# Patient Record
Sex: FEMALE | URBAN - METROPOLITAN AREA
[De-identification: names, ages, dates, MRNs, and addresses within clinical notes are randomized per-mention and may not be internally consistent; named-entity substitution may affect disease eponyms.]

---

## 2022-12-14 ENCOUNTER — OFFICE VISIT (OUTPATIENT)
Dept: URBAN - METROPOLITAN AREA CLINIC 33 | Facility: CLINIC | Age: 52
End: 2022-12-14
Payer: COMMERCIAL

## 2022-12-14 DIAGNOSIS — H52.4 PRESBYOPIA: ICD-10-CM

## 2022-12-14 DIAGNOSIS — H04.123 DRY EYE SYNDROME OF BILATERAL LACRIMAL GLANDS: Primary | ICD-10-CM

## 2022-12-14 PROCEDURE — 92310 CONTACT LENS FITTING OU: CPT

## 2022-12-14 PROCEDURE — 92004 COMPRE OPH EXAM NEW PT 1/>: CPT

## 2022-12-14 ASSESSMENT — VISUAL ACUITY
OS: 20/20
OD: 20/20

## 2022-12-14 ASSESSMENT — INTRAOCULAR PRESSURE
OD: 8
OS: 8

## 2022-12-14 NOTE — IMPRESSION/PLAN
Impression: Presbyopia: H52.4.
-trialed Ultra monovision 
-good fit, vision, and comfort Plan: Patient educated on all refractive findings. SRx was finalized and released to patient. Trial CL released to patient. Discussed adaptation period of at least 3 weeks of full time wear with patient. Further discussed good contact lens hygiene with patient. Continue to monitor.